# Patient Record
Sex: MALE | Race: OTHER | Employment: UNEMPLOYED | URBAN - METROPOLITAN AREA
[De-identification: names, ages, dates, MRNs, and addresses within clinical notes are randomized per-mention and may not be internally consistent; named-entity substitution may affect disease eponyms.]

---

## 2023-03-09 ENCOUNTER — HOSPITAL ENCOUNTER (EMERGENCY)
Age: 58
Discharge: HOME | End: 2023-03-10
Attending: EMERGENCY MEDICINE

## 2023-03-09 VITALS
OXYGEN SATURATION: 100 % | DIASTOLIC BLOOD PRESSURE: 107 MMHG | RESPIRATION RATE: 18 BRPM | HEART RATE: 88 BPM | SYSTOLIC BLOOD PRESSURE: 167 MMHG | TEMPERATURE: 98.2 F

## 2023-03-09 DIAGNOSIS — R42 VERTIGO: Primary | ICD-10-CM

## 2023-03-09 LAB
COMMENT, HOLDF: NORMAL
SAMPLES BEING HELD,HOLD: NORMAL

## 2023-03-09 PROCEDURE — 96374 THER/PROPH/DIAG INJ IV PUSH: CPT

## 2023-03-09 PROCEDURE — 74011250636 HC RX REV CODE- 250/636

## 2023-03-09 PROCEDURE — 99284 EMERGENCY DEPT VISIT MOD MDM: CPT

## 2023-03-09 PROCEDURE — 36415 COLL VENOUS BLD VENIPUNCTURE: CPT

## 2023-03-09 PROCEDURE — 93005 ELECTROCARDIOGRAM TRACING: CPT

## 2023-03-09 PROCEDURE — 74011250636 HC RX REV CODE- 250/636: Performed by: EMERGENCY MEDICINE

## 2023-03-09 RX ORDER — MECLIZINE HCL 12.5 MG 12.5 MG/1
50 TABLET ORAL
Status: COMPLETED | OUTPATIENT
Start: 2023-03-09 | End: 2023-03-09

## 2023-03-09 RX ORDER — DIAZEPAM 5 MG/1
5 TABLET ORAL
Qty: 10 TABLET | Refills: 0 | Status: SHIPPED | OUTPATIENT
Start: 2023-03-09 | End: 2023-03-10 | Stop reason: SDUPTHER

## 2023-03-09 RX ORDER — ONDANSETRON 2 MG/ML
4 INJECTION INTRAMUSCULAR; INTRAVENOUS
Status: COMPLETED | OUTPATIENT
Start: 2023-03-09 | End: 2023-03-09

## 2023-03-09 RX ORDER — ONDANSETRON 2 MG/ML
INJECTION INTRAMUSCULAR; INTRAVENOUS
Status: COMPLETED
Start: 2023-03-09 | End: 2023-03-09

## 2023-03-09 RX ORDER — MECLIZINE HYDROCHLORIDE 25 MG/1
25 TABLET ORAL
Qty: 20 TABLET | Refills: 0 | Status: SHIPPED | OUTPATIENT
Start: 2023-03-09 | End: 2023-03-10 | Stop reason: SDUPTHER

## 2023-03-09 RX ADMIN — MECLIZINE 50 MG: 12.5 TABLET ORAL at 22:27

## 2023-03-09 RX ADMIN — ONDANSETRON 4 MG: 2 INJECTION INTRAMUSCULAR; INTRAVENOUS at 21:07

## 2023-03-09 RX ADMIN — ONDANSETRON HYDROCHLORIDE 4 MG: 2 SOLUTION INTRAMUSCULAR; INTRAVENOUS at 21:07

## 2023-03-09 RX ADMIN — SODIUM CHLORIDE 1000 ML: 9 INJECTION, SOLUTION INTRAVENOUS at 21:07

## 2023-03-09 NOTE — Clinical Note
[facility]  [address]  [address]  [phone]    Work/School Note    Date: 3/9/2023    To Whom It May concern: Mart Lucero was seen and treated today in the emergency room by the following provider(s):  Attending Provider: Shannon Butler MD.      Mart Lucero is excused from work/school on 03/09/23 and 03/10/23. He is medically clear to return to work/school on 3/11/2023.        Sincerely,          Lisa Jernigan MD

## 2023-03-10 LAB
ATRIAL RATE: 87 BPM
CALCULATED P AXIS, ECG09: 55 DEGREES
CALCULATED R AXIS, ECG10: 58 DEGREES
CALCULATED T AXIS, ECG11: 41 DEGREES
DIAGNOSIS, 93000: NORMAL
P-R INTERVAL, ECG05: 174 MS
Q-T INTERVAL, ECG07: 344 MS
QRS DURATION, ECG06: 76 MS
QTC CALCULATION (BEZET), ECG08: 413 MS
VENTRICULAR RATE, ECG03: 87 BPM

## 2023-03-10 RX ORDER — MECLIZINE HYDROCHLORIDE 25 MG/1
25 TABLET ORAL
Qty: 20 TABLET | Refills: 0 | Status: SHIPPED | OUTPATIENT
Start: 2023-03-10 | End: 2023-03-20

## 2023-03-10 RX ORDER — DIAZEPAM 5 MG/1
5 TABLET ORAL
Qty: 10 TABLET | Refills: 0 | Status: SHIPPED | OUTPATIENT
Start: 2023-03-10

## 2023-03-10 NOTE — ED PROVIDER NOTES
47-year-old male with PMHx of depression, reflux, hypercholesterolemia presents emergency department complaining of positional vertigo since this morning with associated nausea and vomiting. Patient notes that when he removes his head he experiences room spinning dizziness which extinguishes at rest.  He has no additional complaints at this time    The history is provided by the patient and a relative. Abdominal Pain   Associated symptoms include vomiting. Vomiting   Associated symptoms include abdominal pain. Past Medical History:   Diagnosis Date    Depression     GERD (gastroesophageal reflux disease)     Headache(784.0)     Hypercholesteremia 2014       No past surgical history on file. Family History:   Problem Relation Age of Onset    Stroke Mother        Social History     Socioeconomic History    Marital status:      Spouse name: Not on file    Number of children: Not on file    Years of education: Not on file    Highest education level: Not on file   Occupational History    Not on file   Tobacco Use    Smoking status: Former     Packs/day: 2.50     Years: 4.00     Pack years: 10.00     Types: Cigarettes     Quit date: 2014     Years since quittin.5    Smokeless tobacco: Not on file   Substance and Sexual Activity    Alcohol use: No    Drug use: No    Sexual activity: Not on file   Other Topics Concern    Not on file   Social History Narrative    Not on file     Social Determinants of Health     Financial Resource Strain: Not on file   Food Insecurity: Not on file   Transportation Needs: Not on file   Physical Activity: Not on file   Stress: Not on file   Social Connections: Not on file   Intimate Partner Violence: Not on file   Housing Stability: Not on file         ALLERGIES: Patient has no known allergies. Review of Systems   Constitutional: Negative. HENT: Negative. Eyes: Negative. Respiratory: Negative. Cardiovascular: Negative.     Gastrointestinal: Positive for abdominal pain and vomiting. Endocrine: Negative. Genitourinary: Negative. Musculoskeletal: Negative. Skin: Negative. Neurological:  Positive for dizziness. Psychiatric/Behavioral: Negative. Vitals:    03/09/23 2044   BP: (!) 167/107   Pulse: 88   Resp: 18   Temp: 98.2 °F (36.8 °C)   SpO2: 100%            Physical Exam  Vitals and nursing note reviewed. Constitutional:       General: He is not in acute distress. Appearance: He is not ill-appearing. Comments: Uncomfortable appearing   HENT:      Head: Normocephalic and atraumatic. Nose: Nose normal.      Mouth/Throat:      Mouth: Mucous membranes are moist.   Eyes:      Extraocular Movements: Extraocular movements intact. Pupils: Pupils are equal, round, and reactive to light. Cardiovascular:      Rate and Rhythm: Normal rate and regular rhythm. Pulses: Normal pulses. Pulmonary:      Effort: Pulmonary effort is normal. No respiratory distress. Breath sounds: Normal breath sounds. Abdominal:      General: There is no distension. Palpations: Abdomen is soft. Tenderness: There is no abdominal tenderness. Musculoskeletal:         General: Normal range of motion. Cervical back: Normal range of motion. Skin:     General: Skin is warm and dry. Neurological:      General: No focal deficit present. Mental Status: He is alert and oriented to person, place, and time. Cranial Nerves: No cranial nerve deficit. Sensory: No sensory deficit. Motor: No weakness. Coordination: Coordination normal.      Gait: Gait normal.   Psychiatric:         Mood and Affect: Mood normal.        Medical Decision Making  DDx: Peripheral vertigo, BPPV, Ménière's disease, labyrinthitis, vestibulitis, central vertigo, posterior CVA    Plan:  - Medications: Meclizine    Reassessment: Patient reports improvement with meclizine, though with some mild ongoing dizziness.   History and exam are more consistent with peripheral vertigo. Will discharge at this time with prescription for meclizine and diazepam, recommendation to follow-up with PCP and ENT, and strict return precautions for new neurologic symptoms such as numbness, weakness, speech changes, vision changes, or any other symptoms that are concerning to him. Amount and/or Complexity of Data Reviewed  ECG/medicine tests: ordered. Risk  Prescription drug management.            Procedures

## 2023-03-10 NOTE — PROGRESS NOTES
Verbal shift change report given to RN Compa Bagley (oncoming nurse) by Samson Neil (offgoing nurse). Report included the following information SBAR, Kardex, ED Summary, and MAR.

## 2023-03-10 NOTE — ED NOTES
Pt discharged stable with discharge instructions. Pt and family verbalize understanding. Pt ambulatory from department with family.

## 2023-03-10 NOTE — DISCHARGE INSTRUCTIONS
You were seen in the emergency department for dizziness. Although an exact cause of your symptoms was not identified, the most likely cause is BPPV. Please take any medications prescribed at this visit as instructed. Please follow-up with an ear doctor and your PCP or return to the emergency department if you experience a worsening of symptoms or any new symptoms that are concerning to you.

## 2024-04-15 ENCOUNTER — APPOINTMENT (OUTPATIENT)
Facility: HOSPITAL | Age: 59
End: 2024-04-15
Payer: COMMERCIAL

## 2024-04-15 ENCOUNTER — HOSPITAL ENCOUNTER (EMERGENCY)
Facility: HOSPITAL | Age: 59
Discharge: LEFT BEFORE DISCHARGE | End: 2024-04-15
Attending: EMERGENCY MEDICINE
Payer: COMMERCIAL

## 2024-04-15 ENCOUNTER — OFFICE VISIT (OUTPATIENT)
Facility: HOSPITAL | Age: 59
End: 2024-04-15
Attending: EMERGENCY MEDICINE
Payer: COMMERCIAL

## 2024-04-15 VITALS
WEIGHT: 164.46 LBS | RESPIRATION RATE: 18 BRPM | HEIGHT: 66 IN | SYSTOLIC BLOOD PRESSURE: 100 MMHG | TEMPERATURE: 99.4 F | DIASTOLIC BLOOD PRESSURE: 67 MMHG | HEART RATE: 87 BPM | BODY MASS INDEX: 26.43 KG/M2 | OXYGEN SATURATION: 92 %

## 2024-04-15 DIAGNOSIS — R05.1 ACUTE COUGH: ICD-10-CM

## 2024-04-15 DIAGNOSIS — I50.21 ACUTE SYSTOLIC CONGESTIVE HEART FAILURE (HCC): Primary | ICD-10-CM

## 2024-04-15 DIAGNOSIS — E87.6 HYPOKALEMIA: ICD-10-CM

## 2024-04-15 LAB
ALBUMIN SERPL-MCNC: 3.3 G/DL (ref 3.5–5)
ALBUMIN/GLOB SERPL: 0.7 (ref 1.1–2.2)
ALP SERPL-CCNC: 110 U/L (ref 45–117)
ALT SERPL-CCNC: 18 U/L (ref 12–78)
ANION GAP SERPL CALC-SCNC: 12 MMOL/L (ref 5–15)
AST SERPL-CCNC: 12 U/L (ref 15–37)
BASOPHILS # BLD: 0 K/UL (ref 0–0.1)
BASOPHILS NFR BLD: 0 % (ref 0–1)
BILIRUB SERPL-MCNC: 1.2 MG/DL (ref 0.2–1)
BUN SERPL-MCNC: 17 MG/DL (ref 6–20)
BUN/CREAT SERPL: 14 (ref 12–20)
CALCIUM SERPL-MCNC: 8.6 MG/DL (ref 8.5–10.1)
CHLORIDE SERPL-SCNC: 104 MMOL/L (ref 97–108)
CO2 SERPL-SCNC: 25 MMOL/L (ref 21–32)
CREAT SERPL-MCNC: 1.19 MG/DL (ref 0.7–1.3)
DIFFERENTIAL METHOD BLD: ABNORMAL
EKG ATRIAL RATE: 93 BPM
EKG DIAGNOSIS: NORMAL
EKG P AXIS: 63 DEGREES
EKG P-R INTERVAL: 174 MS
EKG Q-T INTERVAL: 384 MS
EKG QRS DURATION: 84 MS
EKG QTC CALCULATION (BAZETT): 477 MS
EKG R AXIS: -23 DEGREES
EKG T AXIS: 157 DEGREES
EKG VENTRICULAR RATE: 93 BPM
EOSINOPHIL # BLD: 0 K/UL (ref 0–0.4)
EOSINOPHIL NFR BLD: 0 % (ref 0–7)
ERYTHROCYTE [DISTWIDTH] IN BLOOD BY AUTOMATED COUNT: 15.3 % (ref 11.5–14.5)
FLUAV AG NPH QL IA: NEGATIVE
FLUBV AG NOSE QL IA: NEGATIVE
GLOBULIN SER CALC-MCNC: 4.7 G/DL (ref 2–4)
GLUCOSE SERPL-MCNC: 103 MG/DL (ref 65–100)
HCT VFR BLD AUTO: 35.2 % (ref 36.6–50.3)
HGB BLD-MCNC: 11.4 G/DL (ref 12.1–17)
IMM GRANULOCYTES # BLD AUTO: 0 K/UL (ref 0–0.04)
IMM GRANULOCYTES NFR BLD AUTO: 0 % (ref 0–0.5)
LYMPHOCYTES # BLD: 1.9 K/UL (ref 0.8–3.5)
LYMPHOCYTES NFR BLD: 20 % (ref 12–49)
MAGNESIUM SERPL-MCNC: 1.7 MG/DL (ref 1.6–2.4)
MCH RBC QN AUTO: 27.3 PG (ref 26–34)
MCHC RBC AUTO-ENTMCNC: 32.4 G/DL (ref 30–36.5)
MCV RBC AUTO: 84.2 FL (ref 80–99)
MONOCYTES # BLD: 1 K/UL (ref 0–1)
MONOCYTES NFR BLD: 11 % (ref 5–13)
NEUTS SEG # BLD: 6.6 K/UL (ref 1.8–8)
NEUTS SEG NFR BLD: 69 % (ref 32–75)
NRBC # BLD: 0 K/UL (ref 0–0.01)
NRBC BLD-RTO: 0 PER 100 WBC
NT PRO BNP: 6971 PG/ML (ref 0–125)
PLATELET # BLD AUTO: 206 K/UL (ref 150–400)
PMV BLD AUTO: 10.4 FL (ref 8.9–12.9)
POTASSIUM SERPL-SCNC: 3.1 MMOL/L (ref 3.5–5.1)
PROT SERPL-MCNC: 8 G/DL (ref 6.4–8.2)
RBC # BLD AUTO: 4.18 M/UL (ref 4.1–5.7)
SARS-COV-2 RDRP RESP QL NAA+PROBE: NOT DETECTED
SODIUM SERPL-SCNC: 141 MMOL/L (ref 136–145)
SOURCE: NORMAL
TROPONIN I SERPL HS-MCNC: 36 NG/L (ref 0–76)
WBC # BLD AUTO: 9.5 K/UL (ref 4.1–11.1)

## 2024-04-15 PROCEDURE — 96365 THER/PROPH/DIAG IV INF INIT: CPT

## 2024-04-15 PROCEDURE — 99285 EMERGENCY DEPT VISIT HI MDM: CPT

## 2024-04-15 PROCEDURE — 87804 INFLUENZA ASSAY W/OPTIC: CPT

## 2024-04-15 PROCEDURE — 96361 HYDRATE IV INFUSION ADD-ON: CPT

## 2024-04-15 PROCEDURE — 6370000000 HC RX 637 (ALT 250 FOR IP): Performed by: EMERGENCY MEDICINE

## 2024-04-15 PROCEDURE — 36415 COLL VENOUS BLD VENIPUNCTURE: CPT

## 2024-04-15 PROCEDURE — 87635 SARS-COV-2 COVID-19 AMP PRB: CPT

## 2024-04-15 PROCEDURE — 71045 X-RAY EXAM CHEST 1 VIEW: CPT

## 2024-04-15 PROCEDURE — 85025 COMPLETE CBC W/AUTO DIFF WBC: CPT

## 2024-04-15 PROCEDURE — 93005 ELECTROCARDIOGRAM TRACING: CPT | Performed by: EMERGENCY MEDICINE

## 2024-04-15 PROCEDURE — 6360000002 HC RX W HCPCS: Performed by: EMERGENCY MEDICINE

## 2024-04-15 PROCEDURE — 84484 ASSAY OF TROPONIN QUANT: CPT

## 2024-04-15 PROCEDURE — 80053 COMPREHEN METABOLIC PANEL: CPT

## 2024-04-15 PROCEDURE — 96360 HYDRATION IV INFUSION INIT: CPT

## 2024-04-15 PROCEDURE — 2580000003 HC RX 258: Performed by: EMERGENCY MEDICINE

## 2024-04-15 PROCEDURE — 83880 ASSAY OF NATRIURETIC PEPTIDE: CPT

## 2024-04-15 PROCEDURE — 83735 ASSAY OF MAGNESIUM: CPT

## 2024-04-15 PROCEDURE — 93308 TTE F-UP OR LMTD: CPT

## 2024-04-15 RX ORDER — BENZONATATE 100 MG/1
200 CAPSULE ORAL
Status: COMPLETED | OUTPATIENT
Start: 2024-04-15 | End: 2024-04-15

## 2024-04-15 RX ORDER — POTASSIUM CHLORIDE 20 MEQ/1
40 TABLET, EXTENDED RELEASE ORAL 2 TIMES DAILY
Qty: 12 TABLET | Refills: 0 | Status: SHIPPED | OUTPATIENT
Start: 2024-04-15 | End: 2024-04-18

## 2024-04-15 RX ORDER — POTASSIUM CHLORIDE 750 MG/1
40 TABLET, FILM COATED, EXTENDED RELEASE ORAL ONCE
Status: COMPLETED | OUTPATIENT
Start: 2024-04-15 | End: 2024-04-15

## 2024-04-15 RX ORDER — ACETAMINOPHEN 500 MG
1000 TABLET ORAL
Status: COMPLETED | OUTPATIENT
Start: 2024-04-15 | End: 2024-04-15

## 2024-04-15 RX ORDER — POTASSIUM CHLORIDE 7.45 MG/ML
10 INJECTION INTRAVENOUS ONCE
Status: COMPLETED | OUTPATIENT
Start: 2024-04-15 | End: 2024-04-15

## 2024-04-15 RX ORDER — 0.9 % SODIUM CHLORIDE 0.9 %
500 INTRAVENOUS SOLUTION INTRAVENOUS ONCE
Status: COMPLETED | OUTPATIENT
Start: 2024-04-15 | End: 2024-04-15

## 2024-04-15 RX ADMIN — POTASSIUM CHLORIDE 10 MEQ: 7.46 INJECTION, SOLUTION INTRAVENOUS at 02:11

## 2024-04-15 RX ADMIN — ACETAMINOPHEN 1000 MG: 500 TABLET ORAL at 01:31

## 2024-04-15 RX ADMIN — SODIUM CHLORIDE 500 ML: 9 INJECTION, SOLUTION INTRAVENOUS at 01:30

## 2024-04-15 RX ADMIN — BENZONATATE 200 MG: 100 CAPSULE ORAL at 01:31

## 2024-04-15 RX ADMIN — POTASSIUM CHLORIDE 40 MEQ: 750 TABLET, FILM COATED, EXTENDED RELEASE ORAL at 02:08

## 2024-04-15 ASSESSMENT — PAIN - FUNCTIONAL ASSESSMENT: PAIN_FUNCTIONAL_ASSESSMENT: NONE - DENIES PAIN

## 2024-04-15 NOTE — ED NOTES
[facility] Emergency Room Nursing Note        Patient Name: Mitesh Mauricio      : 1965             MRN: [id number]      Chief Complaint: Cough and Sore Throat      Admit Diagnosis: No admission diagnoses are documented for this encounter.      Surgery: * No surgery found *            MD/RN reviewed discharge instructions and options with patient. Patient verbalized understanding. RN reviewed discharge instructions using teach back method. Patient ambulatory to exit without difficulty and no acute signs of distress. No complaints or needs expressed at this time. Patient counseled on medications prescribed at discharge (If prescribed). Vital signs stable. Patient to follow up with PCP/Specialist on the next business day for appointment. All questions answered by ER RN. Patient decided to leave AMA despite Dr. Rollins trying to dissuade pt.         Lines:        Vitals: Patient Vitals for the past 12 hrs:   Temp Pulse Resp BP SpO2   04/15/24 0230 99.4 °F (37.4 °C) 87 18 100/67 92 %   04/15/24 0200 -- -- -- 100/70 94 %   04/15/24 0130 -- -- -- 94/69 95 %   04/15/24 0030 99.7 °F (37.6 °C) 97 18 92/69 98 %         Signed by: Ramsey Burch RN, MELL, BSN, CMSRN                                              4/15/2024 at 3:12 AM

## 2024-04-15 NOTE — ED PROVIDER NOTES
[facility]  EMERGENCY DEPARTMENT ENCOUNTER      Pt Name: Mitesh Mauricio  MRN: [id number]  Birthdate 1965  Date of evaluation: 4/15/2024  Provider: Daniel Rollins MD    CHIEF COMPLAINT       Chief Complaint   Patient presents with    Cough    Sore Throat         HISTORY OF PRESENT ILLNESS    58-year-old male presents with cough for the last 2 days.  Feels slightly short of breath.  He has a significant cardiac history including MI and cardiomyopathy status post ICD placement.  All of his care has been through [facility] and no records are available for review.            Review of External Medical Records:     Nursing Notes were reviewed.    REVIEW OF SYSTEMS       Review of Systems    Except as noted above the remainder of the review of systems was reviewed and negative.       PAST MEDICAL HISTORY     Past Medical History:   Diagnosis Date    Depression     GERD (gastroesophageal reflux disease)     Headache(784.0)     Hypercholesteremia 2014         SURGICAL HISTORY     No past surgical history on file.      CURRENT MEDICATIONS       Discharge Medication List as of 4/15/2024  2:56 AM          ALLERGIES     Patient has no known allergies.    FAMILY HISTORY       Family History   Problem Relation Age of Onset    Stroke Mother           SOCIAL HISTORY       Social History     Socioeconomic History    Marital status:    Tobacco Use    Smoking status: Former     Current packs/day: 0.00     Types: Cigarettes     Quit date: 2014     Years since quittin.6   Substance and Sexual Activity    Alcohol use: No    Drug use: No           PHYSICAL EXAM       ED Triage Vitals [04/15/24 0030]   BP Temp Temp Source Pulse Respirations SpO2 Height Weight - Scale   92/69 99.7 °F (37.6 °C) Oral 97 18 98 % 1.676 m (5' 6\") 74.6 kg (164 lb 7.4 oz)       Body mass index is 26.55 kg/m².    Physical Exam  Vitals and nursing note reviewed.   Constitutional:       Appearance: Normal appearance. He is

## 2024-04-15 NOTE — ED TRIAGE NOTES
[facility] Emergency Room Nursing Note        Patient Name: Mitesh Mauricio      : 1965             MRN: [id number]      Chief Complaint:  Cough and Sore Throat      Admit Diagnosis: No admission diagnoses are documented for this encounter.      Admitting Provider: No admitting provider for patient encounter.      Surgery: * No surgery found *           Patient arrived to the ER ambulatory from home with complaints of a Cough & a Sore Throat that started 2 days ago.         Lines:        Signed by: Ramsey Burch RN, MELL, BSN, CMSRN                                              4/15/2024 at 12:32 AM

## 2024-11-04 LAB — HBA1C MFR BLD HPLC: 6 %

## 2025-01-20 LAB
LEFT VENTRICULAR EJECTION FRACTION HIGH VALUE: 20 %
LEFT VENTRICULAR EJECTION FRACTION MODE: NORMAL
LV EF: 15 %

## 2025-01-23 ENCOUNTER — OFFICE VISIT (OUTPATIENT)
Age: 60
End: 2025-01-23
Payer: COMMERCIAL

## 2025-01-23 VITALS
HEIGHT: 66 IN | HEART RATE: 68 BPM | WEIGHT: 144 LBS | BODY MASS INDEX: 23.14 KG/M2 | DIASTOLIC BLOOD PRESSURE: 51 MMHG | TEMPERATURE: 97.6 F | SYSTOLIC BLOOD PRESSURE: 73 MMHG | OXYGEN SATURATION: 96 %

## 2025-01-23 DIAGNOSIS — I50.22 CHRONIC SYSTOLIC CONGESTIVE HEART FAILURE, NYHA CLASS 3 (HCC): ICD-10-CM

## 2025-01-23 DIAGNOSIS — N18.2 STAGE 2 CHRONIC KIDNEY DISEASE: ICD-10-CM

## 2025-01-23 DIAGNOSIS — E78.00 HYPERCHOLESTEREMIA: ICD-10-CM

## 2025-01-23 DIAGNOSIS — Z95.810 IMPLANTABLE CARDIOVERTER-DEFIBRILLATOR (ICD) IN SITU: ICD-10-CM

## 2025-01-23 DIAGNOSIS — I70.90 ARTERIOSCLEROTIC VASCULAR DISEASE: ICD-10-CM

## 2025-01-23 DIAGNOSIS — D64.9 ANEMIA, UNSPECIFIED TYPE: ICD-10-CM

## 2025-01-23 DIAGNOSIS — Z12.11 SCREENING FOR COLORECTAL CANCER: ICD-10-CM

## 2025-01-23 DIAGNOSIS — Z76.89 ESTABLISHING CARE WITH NEW DOCTOR, ENCOUNTER FOR: Primary | ICD-10-CM

## 2025-01-23 DIAGNOSIS — Z12.12 SCREENING FOR COLORECTAL CANCER: ICD-10-CM

## 2025-01-23 DIAGNOSIS — I95.9 HYPOTENSION, UNSPECIFIED HYPOTENSION TYPE: ICD-10-CM

## 2025-01-23 PROBLEM — E61.1 IRON DEFICIENCY: Status: ACTIVE | Noted: 2024-11-04

## 2025-01-23 PROBLEM — J90 PLEURAL EFFUSION: Status: RESOLVED | Noted: 2024-05-24 | Resolved: 2025-01-23

## 2025-01-23 PROBLEM — N18.9 CHRONIC KIDNEY DISEASE: Status: ACTIVE | Noted: 2024-04-11

## 2025-01-23 PROBLEM — I25.5 ISCHEMIC MYOCARDIAL DYSFUNCTION: Status: ACTIVE | Noted: 2025-01-23

## 2025-01-23 PROBLEM — I50.9 CHF (CONGESTIVE HEART FAILURE), NYHA CLASS III (HCC): Status: ACTIVE | Noted: 2025-01-23

## 2025-01-23 PROCEDURE — 99205 OFFICE O/P NEW HI 60 MIN: CPT | Performed by: STUDENT IN AN ORGANIZED HEALTH CARE EDUCATION/TRAINING PROGRAM

## 2025-01-23 RX ORDER — EPLERENONE 25 MG/1
25 TABLET, FILM COATED ORAL DAILY
COMMUNITY

## 2025-01-23 RX ORDER — SACUBITRIL AND VALSARTAN 49; 51 MG/1; MG/1
1 TABLET, FILM COATED ORAL 2 TIMES DAILY
COMMUNITY

## 2025-01-23 RX ORDER — NITROGLYCERIN 0.4 MG/1
0.4 TABLET SUBLINGUAL AS NEEDED
COMMUNITY
Start: 2024-05-24

## 2025-01-23 RX ORDER — ASPIRIN 81 MG/1
81 TABLET ORAL DAILY
COMMUNITY

## 2025-01-23 RX ORDER — FUROSEMIDE 40 MG/1
40 TABLET ORAL 2 TIMES DAILY
COMMUNITY

## 2025-01-23 RX ORDER — DAPAGLIFLOZIN 10 MG/1
10 TABLET, FILM COATED ORAL DAILY
COMMUNITY

## 2025-01-23 RX ORDER — ATORVASTATIN CALCIUM 80 MG/1
80 TABLET, FILM COATED ORAL DAILY
COMMUNITY

## 2025-01-23 SDOH — ECONOMIC STABILITY: FOOD INSECURITY: WITHIN THE PAST 12 MONTHS, THE FOOD YOU BOUGHT JUST DIDN'T LAST AND YOU DIDN'T HAVE MONEY TO GET MORE.: SOMETIMES TRUE

## 2025-01-23 SDOH — ECONOMIC STABILITY: FOOD INSECURITY: WITHIN THE PAST 12 MONTHS, YOU WORRIED THAT YOUR FOOD WOULD RUN OUT BEFORE YOU GOT MONEY TO BUY MORE.: SOMETIMES TRUE

## 2025-01-23 ASSESSMENT — ENCOUNTER SYMPTOMS
COUGH: 0
BLOOD IN STOOL: 0
NAUSEA: 0
CONSTIPATION: 0
VOMITING: 0
ABDOMINAL PAIN: 0
DIARRHEA: 0
SHORTNESS OF BREATH: 0

## 2025-01-23 ASSESSMENT — PATIENT HEALTH QUESTIONNAIRE - PHQ9
SUM OF ALL RESPONSES TO PHQ QUESTIONS 1-9: 0
1. LITTLE INTEREST OR PLEASURE IN DOING THINGS: NOT AT ALL
2. FEELING DOWN, DEPRESSED OR HOPELESS: NOT AT ALL
SUM OF ALL RESPONSES TO PHQ QUESTIONS 1-9: 0
SUM OF ALL RESPONSES TO PHQ9 QUESTIONS 1 & 2: 0
SUM OF ALL RESPONSES TO PHQ QUESTIONS 1-9: 0
SUM OF ALL RESPONSES TO PHQ QUESTIONS 1-9: 0

## 2025-01-23 NOTE — PROGRESS NOTES
RM13    Chief Complaint   Patient presents with    New Patient       Vitals:    01/23/25 1436 01/23/25 1442   BP: (!) 79/52 (!) 73/51   Site: Right Upper Arm Left Upper Arm   Position: Sitting Sitting   Pulse: 68    Temp: 97.6 °F (36.4 °C)    TempSrc: Oral    SpO2: 96%    Weight: 65.3 kg (144 lb)    Height: 1.676 m (5' 6\")           \"Have you been to the ER, urgent care clinic since your last visit?  Hospitalized since your last visit?\"    NO    “Have you seen or consulted any other health care providers outside of [facility] since your last visit?”    YES - When: approximately 1 months ago.  Where and Why: for heart doctor.        “Have you had a colorectal cancer screening such as a colonoscopy/FIT/Cologuard?    NO    No colonoscopy on file  No cologuard on file  No FIT/FOBT on file   No flexible sigmoidoscopy on file         Click Here for Release of Records Request   AVS  education, follow up, and recommendations provided and addressed with patient.  services used to advise patient No  
Name band;
Rate and Rhythm: Regular rhythm.      Heart sounds: No murmur heard.  Pulmonary:      Effort: Pulmonary effort is normal.      Breath sounds: Normal breath sounds. No wheezing.   Abdominal:      General: Bowel sounds are normal. There is no distension.      Palpations: Abdomen is soft.      Tenderness: There is no abdominal tenderness.   Musculoskeletal:      Right lower leg: No edema.      Left lower leg: No edema.   Neurological:      Mental Status: He is alert.   Psychiatric:         Mood and Affect: Mood normal.         Behavior: Behavior normal.                Assessment & Plan   ASSESSMENT/PLAN:  1. Establishing care with new doctor, encounter for  2. Hypotension, unspecified hypotension type  3. Chronic systolic congestive heart failure, NYHA class 3 ([facility])  Overview:  HFrEF (heart failure with reduced ejection fraction)  4. Implantable cardioverter-defibrillator (ICD) in situ  5. Hypercholesteremia  6. Stage 2 chronic kidney disease  Overview:  CKD (chronic kidney disease)  7. Arteriosclerotic vascular disease  8. Anemia, unspecified type  Overview:  Anemia  9. Screening for colorectal cancer  -     Cologuard (Fecal DNA Colorectal Cancer Screening)         Patient presents for establish care visit with me.  Acute concerns addressed.  Chronic problems reviewed.  Medications and history reviewed.  See below for additional information.  Hypotension: Chronic and ongoing.  Patient blood pressure was extremely low at 73/51.  Discussed with patient that this blood pressure could potentially be life-threatening.  Patient however states this is chronic and he has no symptoms from this blood pressure.  Given my concerns for his symptoms, I went ahead and spoke with patient's cardiologist Dr. Chacon. She agrees with the plan to stop patient's bisoprolol.   She also mentioned that with patient's recent studies, there are of concerning features.  Patient likely needs a right heart cath, and pending on findings of that

## 2025-01-23 NOTE — PATIENT INSTRUCTIONS
- Sunday (6:00AM - 6:00PM)   [facility] [employer] - Serves seniors age 60+ who are not able to drive. Transportation is for medical appointments, grocery shopping, or personal business (ex: banking). Seniors must be ambulatory. Areas: [place] ([address], [address]) and [place] ([address], [address], [address], [address])  Phone: [facility] - [phone]; [place] area - [phone]  Website: https://www.[place]/  [facility] - Transportation services for residents of [facility] who are 60+, disabled, or have low income (200% below Federal Poverty Level).  Website: http://www.[place]/get-help/transportation-services/  Call to schedule an appointment: [phone]  [facility] - Transportation services for residents of [facility] who are 60+, disabled, or meet income guidelines.  Phone: [phone]   Website: [email or web address]/Mobility-Services  The service area includes any location in [facility]. Rides are available to destinations outside the [facility] for employment and medical purposes, including limited service to the cities of [place], [facility] and [place]; the [facility] [place], [facility] and [place]; and [facility]    [facility]  What they offer: Public transportation and paratransit services in the [facility] for local residents, businesses and visitors of [facility] and the surrounding counties.   [facility] [employer] is pleased to offer an express route to [place]. This program is specifically designed senior persons over 65 years of age and the disabled.  Information for any of our services are available during regular business hours by calling [phone]  Paratransit Phone: [phone].  Website: [email or web address]-Transit  Hours of Operation: [place] Area

## 2025-02-08 LAB — NONINV COLON CA DNA+OCC BLD SCRN STL QL: NEGATIVE

## 2025-02-20 ENCOUNTER — OFFICE VISIT (OUTPATIENT)
Age: 60
End: 2025-02-20
Payer: COMMERCIAL

## 2025-02-20 VITALS
TEMPERATURE: 97.6 F | SYSTOLIC BLOOD PRESSURE: 80 MMHG | WEIGHT: 147 LBS | OXYGEN SATURATION: 96 % | HEART RATE: 77 BPM | BODY MASS INDEX: 23.73 KG/M2 | DIASTOLIC BLOOD PRESSURE: 56 MMHG

## 2025-02-20 DIAGNOSIS — Z95.810 IMPLANTABLE CARDIOVERTER-DEFIBRILLATOR (ICD) IN SITU: ICD-10-CM

## 2025-02-20 DIAGNOSIS — I95.9 HYPOTENSION, UNSPECIFIED HYPOTENSION TYPE: Primary | ICD-10-CM

## 2025-02-20 DIAGNOSIS — I50.22 CHRONIC SYSTOLIC CONGESTIVE HEART FAILURE, NYHA CLASS 3 (HCC): ICD-10-CM

## 2025-02-20 PROCEDURE — 99214 OFFICE O/P EST MOD 30 MIN: CPT | Performed by: STUDENT IN AN ORGANIZED HEALTH CARE EDUCATION/TRAINING PROGRAM

## 2025-02-20 RX ORDER — BISOPROLOL FUMARATE 5 MG/1
5 TABLET, FILM COATED ORAL DAILY
COMMUNITY

## 2025-02-20 RX ORDER — BUDESONIDE AND FORMOTEROL FUMARATE DIHYDRATE 160; 4.5 UG/1; UG/1
2 AEROSOL RESPIRATORY (INHALATION) AS NEEDED
COMMUNITY
Start: 2025-02-19

## 2025-02-20 RX ORDER — BISOPROLOL FUMARATE 5 MG/1
5 TABLET, FILM COATED ORAL DAILY
COMMUNITY
Start: 2025-01-23 | End: 2025-02-20

## 2025-02-20 ASSESSMENT — ENCOUNTER SYMPTOMS
ABDOMINAL PAIN: 0
VOMITING: 0
NAUSEA: 0
COUGH: 0
DIARRHEA: 0
CONSTIPATION: 0
SHORTNESS OF BREATH: 0
BLOOD IN STOOL: 0

## 2025-02-20 NOTE — PROGRESS NOTES
Mitesh Mauricio (:  1965) is a 59 y.o. male, Established patient, here for evaluation of the following chief complaint(s):  Follow-up        Subjective   SUBJECTIVE/OBJECTIVE:  Patient presents today to follow-up      Chronic problems:    HFrEF - ischemic cardiomyopathy, s/p anterior MI 2023 - PCI in [place]  - LVEF 15-20%, NYHA class 3, echo done 25  S/p ICD single chamber 2024  - He is on GDMT with Entresto, eplerenone, and Farxiga.  - He also takes Lasix 40 mg twice a day.  - He has no symptoms today including chest pain, worsening shortness of breath or leg swelling.   - Has RHC done yesterday - RH filling pressure, L heart filling pressure elevated. Mixed pulmonary hypertension  - He was told to restart his bisoprolol.    Hypotension - runs 80s/60s at home. Today it is 80/56  Patient took medications today    CAD/Dyslipidemia he is on ASA 81 mg daily and atorvastatin 80 mg daily    CKD history - recent Cr 0.97 with eGFR 89    History of pleural effusion s/p thoracentesis - this was back in [place] - last time was 2024    Tobacco use - history, quit     Preventative care:  Health Maintenance Due   Topic Date Due    HIV screen  Never done    Hepatitis C screen  Never done    DTaP/Tdap/Td vaccine (1 - Tdap) Never done    Pneumococcal 50+ years Vaccine (1 of 2 - PCV) Never done    Shingles vaccine (1 of 2) Never done    Respiratory Syncytial Virus (RSV) Pregnant or age 60 yrs+ (1 - Risk 60-74 years 1-dose series) Never done    Flu vaccine (1) Never done    COVID-19 Vaccine ( - - season) Never done          Past Medical History:   Diagnosis Date    Arteriosclerotic vascular disease 2024    CHF (congestive heart failure), NYHA class III (HCC) 2025    Chronic kidney disease 2024    Depression     GERD (gastroesophageal reflux disease)     Headache(784.0)     Hypercholesteremia 2014    Pleural effusion 2024    Pleural effusion       History reviewed. No

## 2025-02-20 NOTE — PROGRESS NOTES
RM 14    Chief Complaint   Patient presents with    Follow-up       Vitals:    02/20/25 1509 02/20/25 1510   BP: (!) 91/56 (!) 80/56   Site: Left Upper Arm Right Upper Arm   Position: Sitting Sitting   Pulse: 77    Temp: 97.6 °F (36.4 °C)    TempSrc: Oral    SpO2: 96%    Weight: 66.7 kg (147 lb)         \"Have you been to the ER, urgent care clinic since your last visit?  Hospitalized since your last visit?\"    NO    “Have you seen or consulted any other health care providers outside of [facility] since your last visit?”    NO            Click Here for Release of Records Request   AVS  education, follow up, and recommendations provided and addressed with patient.  services used to advise patient No

## 2025-04-01 ENCOUNTER — OFFICE VISIT (OUTPATIENT)
Age: 60
End: 2025-04-01
Payer: COMMERCIAL

## 2025-04-01 VITALS
TEMPERATURE: 97.9 F | BODY MASS INDEX: 24.59 KG/M2 | HEART RATE: 71 BPM | WEIGHT: 153 LBS | DIASTOLIC BLOOD PRESSURE: 62 MMHG | RESPIRATION RATE: 18 BRPM | OXYGEN SATURATION: 97 % | SYSTOLIC BLOOD PRESSURE: 92 MMHG | HEIGHT: 66 IN

## 2025-04-01 DIAGNOSIS — I95.9 HYPOTENSION, UNSPECIFIED HYPOTENSION TYPE: Primary | ICD-10-CM

## 2025-04-01 DIAGNOSIS — Z11.59 ENCOUNTER FOR HEPATITIS C SCREENING TEST FOR LOW RISK PATIENT: ICD-10-CM

## 2025-04-01 DIAGNOSIS — Z11.4 SCREENING FOR HIV (HUMAN IMMUNODEFICIENCY VIRUS): ICD-10-CM

## 2025-04-01 DIAGNOSIS — I50.22 CHRONIC SYSTOLIC CONGESTIVE HEART FAILURE, NYHA CLASS 3 (HCC): ICD-10-CM

## 2025-04-01 DIAGNOSIS — Z95.810 IMPLANTABLE CARDIOVERTER-DEFIBRILLATOR (ICD) IN SITU: ICD-10-CM

## 2025-04-01 PROBLEM — I50.9: Status: ACTIVE | Noted: 2025-04-01

## 2025-04-01 PROCEDURE — 99214 OFFICE O/P EST MOD 30 MIN: CPT | Performed by: STUDENT IN AN ORGANIZED HEALTH CARE EDUCATION/TRAINING PROGRAM

## 2025-04-01 PROCEDURE — G2211 COMPLEX E/M VISIT ADD ON: HCPCS | Performed by: STUDENT IN AN ORGANIZED HEALTH CARE EDUCATION/TRAINING PROGRAM

## 2025-04-01 RX ORDER — METOPROLOL SUCCINATE 25 MG/1
25 TABLET, EXTENDED RELEASE ORAL DAILY
COMMUNITY
Start: 2025-03-07 | End: 2025-04-01 | Stop reason: SDUPTHER

## 2025-04-01 RX ORDER — IVABRADINE 5 MG/1
5 TABLET, FILM COATED ORAL 2 TIMES DAILY WITH MEALS
Qty: 180 TABLET | Refills: 0 | Status: SHIPPED | OUTPATIENT
Start: 2025-04-01

## 2025-04-01 RX ORDER — IVABRADINE 5 MG/1
5 TABLET, FILM COATED ORAL 2 TIMES DAILY WITH MEALS
COMMUNITY
End: 2025-04-01 | Stop reason: SDUPTHER

## 2025-04-01 RX ORDER — METOPROLOL SUCCINATE 25 MG/1
12.5 TABLET, EXTENDED RELEASE ORAL DAILY
Qty: 45 TABLET | Refills: 0 | Status: SHIPPED
Start: 2025-04-01 | End: 2025-04-01

## 2025-04-01 RX ORDER — SACUBITRIL AND VALSARTAN 24; 26 MG/1; MG/1
1 TABLET, FILM COATED ORAL 2 TIMES DAILY
COMMUNITY
Start: 2025-03-25 | End: 2025-06-23

## 2025-04-01 RX ORDER — METOPROLOL SUCCINATE 25 MG/1
12.5 TABLET, EXTENDED RELEASE ORAL DAILY
Qty: 45 TABLET | Refills: 0 | Status: SHIPPED | OUTPATIENT
Start: 2025-04-01

## 2025-04-01 SDOH — ECONOMIC STABILITY: FOOD INSECURITY: WITHIN THE PAST 12 MONTHS, YOU WORRIED THAT YOUR FOOD WOULD RUN OUT BEFORE YOU GOT MONEY TO BUY MORE.: NEVER TRUE

## 2025-04-01 SDOH — ECONOMIC STABILITY: FOOD INSECURITY: WITHIN THE PAST 12 MONTHS, THE FOOD YOU BOUGHT JUST DIDN'T LAST AND YOU DIDN'T HAVE MONEY TO GET MORE.: NEVER TRUE

## 2025-04-01 ASSESSMENT — PATIENT HEALTH QUESTIONNAIRE - PHQ9
SUM OF ALL RESPONSES TO PHQ QUESTIONS 1-9: 0
1. LITTLE INTEREST OR PLEASURE IN DOING THINGS: NOT AT ALL
8. MOVING OR SPEAKING SO SLOWLY THAT OTHER PEOPLE COULD HAVE NOTICED. OR THE OPPOSITE, BEING SO FIGETY OR RESTLESS THAT YOU HAVE BEEN MOVING AROUND A LOT MORE THAN USUAL: NOT AT ALL
4. FEELING TIRED OR HAVING LITTLE ENERGY: NOT AT ALL
SUM OF ALL RESPONSES TO PHQ QUESTIONS 1-9: 0
10. IF YOU CHECKED OFF ANY PROBLEMS, HOW DIFFICULT HAVE THESE PROBLEMS MADE IT FOR YOU TO DO YOUR WORK, TAKE CARE OF THINGS AT HOME, OR GET ALONG WITH OTHER PEOPLE: NOT DIFFICULT AT ALL
3. TROUBLE FALLING OR STAYING ASLEEP: NOT AT ALL
9. THOUGHTS THAT YOU WOULD BE BETTER OFF DEAD, OR OF HURTING YOURSELF: NOT AT ALL
SUM OF ALL RESPONSES TO PHQ QUESTIONS 1-9: 0
7. TROUBLE CONCENTRATING ON THINGS, SUCH AS READING THE NEWSPAPER OR WATCHING TELEVISION: NOT AT ALL
6. FEELING BAD ABOUT YOURSELF - OR THAT YOU ARE A FAILURE OR HAVE LET YOURSELF OR YOUR FAMILY DOWN: NOT AT ALL
SUM OF ALL RESPONSES TO PHQ QUESTIONS 1-9: 0
2. FEELING DOWN, DEPRESSED OR HOPELESS: NOT AT ALL
5. POOR APPETITE OR OVEREATING: NOT AT ALL

## 2025-04-01 ASSESSMENT — ENCOUNTER SYMPTOMS
BLOOD IN STOOL: 0
CONSTIPATION: 0
VOMITING: 0
SHORTNESS OF BREATH: 0
COUGH: 0
NAUSEA: 0
ABDOMINAL PAIN: 0
DIARRHEA: 0

## 2025-04-01 NOTE — PROGRESS NOTES
Mitesh Mauricio (:  1965) is a 59 y.o. male, Established patient, here for evaluation of the following chief complaint(s):  6 weeks follow up and Need rx for 90 days going out of town         Subjective   SUBJECTIVE/OBJECTIVE:  Patient presents today to follow-up      Chronic problems:    HFrEF - ischemic cardiomyopathy, s/p anterior MI 2023 - PCI in [place]  - LVEF 15-20%, NYHA class 3, echo done 25  S/p ICD single chamber 2024  - He is on GDMT with Entresto, eplerenone, and Farxiga.  - He also takes Lasix 40 mg twice a day.  - He has no symptoms today including chest pain, worsening shortness of breath or leg swelling.   - Has RHC done yesterday - RH filling pressure, L heart filling pressure elevated. Mixed pulmonary hypertension  - Reviewed over last cardiology note with Dr. Chacon    Current Outpatient Medications   Medication Instructions    aspirin (ASPIR) 81 mg, Daily    atorvastatin (LIPITOR) 80 mg, Daily    eplerenone (INSPRA) 25 mg, Daily    Farxiga 10 mg, Daily    furosemide (LASIX) 40 mg, 2 times daily    ivabradine (CORLANOR) 1 mg, 2 times daily with meals    metoprolol succinate XL (TOPROL-XL) 12.5 mg, Oral, Daily, Do not crush or chew.    nitroglycerin (NITROSTAT) 0.4 mg, As needed    omeprazole (PRILOSEC) 20 mg, Daily before breakfast    sacubitril-valsartan (Entresto) 24-26 mg tablet 1 tablet, 2 times daily    Symbicort 160-4.5 MCG/ACT inhaler 1 puff, As needed       Hypotension - Today it is 92/62  Patient took medications today  There are confusion about medications so medication lists were compared.  The major discrepancy is the ivabradine which he takes 5 mg BID as opposed to 1 mg BID    CAD/Dyslipidemia he is on ASA 81 mg daily and atorvastatin 80 mg daily    CKD history -recent Cr/ GFR >60    History of pleural effusion s/p thoracentesis - this was back in [place] - last time was 2024    Tobacco use - history, quit     Preventative care:  Health Maintenance Due

## 2025-04-01 NOTE — PROGRESS NOTES
RM: 14  Chief Complaint   Patient presents with    6 weeks follow up    Need rx for 90 days going out of town       Vitals:    04/01/25 1412   BP: 92/62   Pulse: 71   Resp: 18   Temp: 97.9 °F (36.6 °C)   SpO2: 97%      FASTING: Yes  Have you been to the ER, urgent care clinic since your last visit?  Hospitalized since your last visit?\"    NO  “Have you seen or consulted any other health care providers outside of [facility] since your last visit?”    NO    Click Here for Release of Records Request

## 2025-04-02 ENCOUNTER — RESULTS FOLLOW-UP (OUTPATIENT)
Age: 60
End: 2025-04-02

## 2025-04-02 LAB
ANION GAP SERPL CALC-SCNC: 11 MMOL/L (ref 2–12)
BUN SERPL-MCNC: 32 MG/DL (ref 6–20)
BUN/CREAT SERPL: 23 (ref 12–20)
CALCIUM SERPL-MCNC: 9.6 MG/DL (ref 8.5–10.1)
CHLORIDE SERPL-SCNC: 105 MMOL/L (ref 97–108)
CO2 SERPL-SCNC: 21 MMOL/L (ref 21–32)
CREAT SERPL-MCNC: 1.37 MG/DL (ref 0.7–1.3)
GLUCOSE SERPL-MCNC: 127 MG/DL (ref 65–100)
HIV 1+2 AB+HIV1 P24 AG SERPL QL IA: NONREACTIVE
HIV 1/2 RESULT COMMENT: NORMAL
MAGNESIUM SERPL-MCNC: 2.5 MG/DL (ref 1.6–2.4)
POTASSIUM SERPL-SCNC: 3.5 MMOL/L (ref 3.5–5.1)
SODIUM SERPL-SCNC: 137 MMOL/L (ref 136–145)

## 2025-04-02 NOTE — RESULT ENCOUNTER NOTE
Please call and notify patient that his magnesium is NOT low, but slightly high.     His kidney labs are abnormal and suggest that he may be a bit fluid down. This might mean too much lasix. If he is taking 40 mg twice a day of the lasix (Furosemide), he should decrease to daily and notify his cardiologist of the change.    Thanks,    Priyanka Madden MD

## 2025-04-03 LAB
HCV AB SERPL QL IA: NORMAL
HCV IGG SERPL QL IA: NON REACTIVE S/CO RATIO

## 2025-04-03 NOTE — RESULT ENCOUNTER NOTE
Please call and notify patient that his magnesium is NOT low, but slightly high.     His kidney labs are abnormal and suggest that he may be a bit fluid down. This might mean too much lasix. If he is taking 40 mg twice a day of the lasix (Furosemide), he should decrease to daily and notify his cardiologist of the change. He also is negative for hepatitis C.    Thanks,    Priyanka Madden MD

## 2025-04-08 NOTE — TELEPHONE ENCOUNTER
----- Message from Dr. Priyanka Madden MD sent at 4/2/2025 12:48 PM EDT -----  Please call and notify patient that his magnesium is NOT low, but slightly high.     His kidney labs are abnormal and suggest that he may be a bit fluid down. This might mean too much lasix. If he is taking 40 mg twice a day of the lasix (Furosemide), he should decrease to daily and notify his cardiologist of the change.    Thanks,    Priyanka Madden MD

## 2025-04-08 NOTE — TELEPHONE ENCOUNTER
Pt called ,Reviewed a blood test result and recommendations,he stated he will do and was [place] to [place] .